# Patient Record
Sex: FEMALE | Race: OTHER | URBAN - METROPOLITAN AREA
[De-identification: names, ages, dates, MRNs, and addresses within clinical notes are randomized per-mention and may not be internally consistent; named-entity substitution may affect disease eponyms.]

---

## 2021-03-03 ENCOUNTER — OFFICE VISIT (OUTPATIENT)
Dept: URBAN - METROPOLITAN AREA CLINIC 115 | Facility: CLINIC | Age: 66
End: 2021-03-03
Payer: MEDICARE

## 2021-03-03 ENCOUNTER — OUT OF OFFICE VISIT (OUTPATIENT)
Dept: URBAN - METROPOLITAN AREA MEDICAL CENTER 31 | Facility: MEDICAL CENTER | Age: 66
End: 2021-03-03
Payer: MEDICARE

## 2021-03-03 ENCOUNTER — OFFICE VISIT (OUTPATIENT)
Dept: URBAN - METROPOLITAN AREA CLINIC 115 | Facility: CLINIC | Age: 66
End: 2021-03-03

## 2021-03-03 ENCOUNTER — WEB ENCOUNTER (OUTPATIENT)
Dept: URBAN - METROPOLITAN AREA CLINIC 115 | Facility: CLINIC | Age: 66
End: 2021-03-03

## 2021-03-03 VITALS
HEART RATE: 54 BPM | SYSTOLIC BLOOD PRESSURE: 143 MMHG | BODY MASS INDEX: 26.87 KG/M2 | WEIGHT: 128 LBS | HEIGHT: 58 IN | TEMPERATURE: 97.3 F | RESPIRATION RATE: 16 BRPM | DIASTOLIC BLOOD PRESSURE: 81 MMHG

## 2021-03-03 DIAGNOSIS — R10.11 ABDOMINAL BURNING SENSATION IN RIGHT UPPER QUADRANT: ICD-10-CM

## 2021-03-03 DIAGNOSIS — R17 CHOLESTATIC JAUNDICE: ICD-10-CM

## 2021-03-03 DIAGNOSIS — R74.01 ALT (SGPT) LEVEL RAISED: ICD-10-CM

## 2021-03-03 DIAGNOSIS — E80.6 ABNORMAL BILIRUBIN TEST: ICD-10-CM

## 2021-03-03 DIAGNOSIS — R93.2 ABNORMAL ULTRASOUND OF LIVER: ICD-10-CM

## 2021-03-03 DIAGNOSIS — R10.11 RIGHT UPPER QUADRANT ABDOMINAL PAIN: ICD-10-CM

## 2021-03-03 DIAGNOSIS — R94.5 ABNORMAL LIVER FUNCTION TESTS: ICD-10-CM

## 2021-03-03 PROBLEM — 15627741000119108: Status: ACTIVE | Noted: 2021-03-03

## 2021-03-03 PROBLEM — 15633881000119102: Status: ACTIVE | Noted: 2021-03-03

## 2021-03-03 PROBLEM — 197433003: Status: ACTIVE | Noted: 2021-03-03

## 2021-03-03 PROBLEM — 267432004: Status: ACTIVE | Noted: 2021-03-03

## 2021-03-03 PROBLEM — 166603001: Status: ACTIVE | Noted: 2021-03-03

## 2021-03-03 PROBLEM — 301717006: Status: ACTIVE | Noted: 2021-03-03

## 2021-03-03 PROCEDURE — 99204 OFFICE O/P NEW MOD 45 MIN: CPT | Performed by: INTERNAL MEDICINE

## 2021-03-03 PROCEDURE — G8427 DOCREV CUR MEDS BY ELIG CLIN: HCPCS | Performed by: PHYSICIAN ASSISTANT

## 2021-03-03 PROCEDURE — 99222 1ST HOSP IP/OBS MODERATE 55: CPT | Performed by: PHYSICIAN ASSISTANT

## 2021-03-03 PROCEDURE — 99245 OFF/OP CONSLTJ NEW/EST HI 55: CPT | Performed by: INTERNAL MEDICINE

## 2021-03-03 RX ORDER — ONDANSETRON 4 MG/1
1 TABLET ON THE TONGUE AND ALLOW TO DISSOLVE TABLET, ORALLY DISINTEGRATING ORAL ONCE A DAY
Status: ACTIVE | COMMUNITY

## 2021-03-03 RX ORDER — LEVOTHYROXINE SODIUM 0.1 MG/1
TABLET ORAL
Qty: 0 | Refills: 0 | Status: ACTIVE | COMMUNITY
Start: 1900-01-01

## 2021-03-03 RX ORDER — TRAZODONE HYDROCHLORIDE 50 MG/1
TABLET ORAL
Qty: 0 | Refills: 0 | Status: ACTIVE | COMMUNITY
Start: 1900-01-01

## 2021-03-03 RX ORDER — ATORVASTATIN CALCIUM 20 MG/1
1 TABLET TABLET, FILM COATED ORAL ONCE A DAY
Status: ACTIVE | COMMUNITY

## 2021-03-03 RX ORDER — GEMFIBROZIL 600 MG/1
TAKE 1 TABLET (600 MG) BY ORAL ROUTE 2 TIMES PER DAY 30 MINUTES BEFORE MORNING AND EVENING MEAL TABLET, FILM COATED ORAL 2
Qty: 0 | Refills: 0 | Status: ON HOLD | COMMUNITY
Start: 1900-01-01

## 2021-03-03 NOTE — HPI-OTHER HISTORIES
Case of 66 y/o  female  with hysterectomy,smoker,HLD,hypothyroidism that came for eval due to abnormal liver function test and liver u/s showing gallbladder polyp, patient also refer RUQ pain, Jaundice, dark urine and light stools for the past few weeks, she has icteric sclera also, she says last alcohol drink was 15 days ago, smoke cigarrettes ocassionally, denies IVDA. Prior EGD with us 2018 with h.pylori s/p tx. Denies black stools or rectal bleeding, no fluid waves in the belly but it was tender on RUQ.no fever

## 2021-03-03 NOTE — PHYSICAL EXAM GASTROINTESTINAL
normal bowel sounds , no masses palpable , no guarding or rigidity , soft,  tender on RUQ, nondistended, no caput medusa or fluid wave

## 2021-03-05 ENCOUNTER — OUT OF OFFICE VISIT (OUTPATIENT)
Dept: URBAN - METROPOLITAN AREA MEDICAL CENTER 31 | Facility: MEDICAL CENTER | Age: 66
End: 2021-03-05
Payer: MEDICARE

## 2021-03-05 DIAGNOSIS — R10.11 ABDOMINAL BURNING SENSATION IN RIGHT UPPER QUADRANT: ICD-10-CM

## 2021-03-05 DIAGNOSIS — R17 CHOLESTATIC JAUNDICE: ICD-10-CM

## 2021-03-05 PROCEDURE — 99232 SBSQ HOSP IP/OBS MODERATE 35: CPT | Performed by: INTERNAL MEDICINE

## 2021-03-06 ENCOUNTER — OUT OF OFFICE VISIT (OUTPATIENT)
Dept: URBAN - METROPOLITAN AREA MEDICAL CENTER 31 | Facility: MEDICAL CENTER | Age: 66
End: 2021-03-06
Payer: MEDICARE

## 2021-03-06 DIAGNOSIS — R10.11 ABDOMINAL BURNING SENSATION IN RIGHT UPPER QUADRANT: ICD-10-CM

## 2021-03-06 DIAGNOSIS — R74.8 ABNORMAL ALKALINE PHOSPHATASE TEST: ICD-10-CM

## 2021-03-06 PROCEDURE — 99232 SBSQ HOSP IP/OBS MODERATE 35: CPT | Performed by: INTERNAL MEDICINE

## 2021-04-01 ENCOUNTER — OFFICE VISIT (OUTPATIENT)
Dept: URBAN - METROPOLITAN AREA CLINIC 115 | Facility: CLINIC | Age: 66
End: 2021-04-01
Payer: MEDICARE

## 2021-04-01 VITALS
TEMPERATURE: 97.7 F | SYSTOLIC BLOOD PRESSURE: 112 MMHG | HEART RATE: 71 BPM | WEIGHT: 127 LBS | BODY MASS INDEX: 26.66 KG/M2 | DIASTOLIC BLOOD PRESSURE: 77 MMHG | RESPIRATION RATE: 16 BRPM | HEIGHT: 58 IN

## 2021-04-01 DIAGNOSIS — K75.4 AUTOIMMUNE HEPATITIS: ICD-10-CM

## 2021-04-01 PROCEDURE — 99214 OFFICE O/P EST MOD 30 MIN: CPT | Performed by: INTERNAL MEDICINE

## 2021-04-01 RX ORDER — GEMFIBROZIL 600 MG/1
TAKE 1 TABLET (600 MG) BY ORAL ROUTE 2 TIMES PER DAY 30 MINUTES BEFORE MORNING AND EVENING MEAL TABLET, FILM COATED ORAL 2
Qty: 0 | Refills: 0 | Status: ON HOLD | COMMUNITY
Start: 1900-01-01

## 2021-04-01 RX ORDER — PREDNISONE 10 MG/1
3 TABLET TABLET ORAL ONCE A DAY
Qty: 90 TABLET | Refills: 1 | OUTPATIENT
Start: 2021-04-01 | End: 2021-05-31

## 2021-04-01 RX ORDER — ATORVASTATIN CALCIUM 20 MG/1
1 TABLET TABLET, FILM COATED ORAL ONCE A DAY
Status: ACTIVE | COMMUNITY

## 2021-04-01 RX ORDER — LEVOTHYROXINE SODIUM 0.1 MG/1
TABLET ORAL
Qty: 0 | Refills: 0 | Status: ACTIVE | COMMUNITY
Start: 1900-01-01

## 2021-04-01 RX ORDER — TRAZODONE HYDROCHLORIDE 50 MG/1
TABLET ORAL
Qty: 0 | Refills: 0 | Status: ACTIVE | COMMUNITY
Start: 1900-01-01

## 2021-04-01 RX ORDER — ONDANSETRON 4 MG/1
1 TABLET ON THE TONGUE AND ALLOW TO DISSOLVE TABLET, ORALLY DISINTEGRATING ORAL ONCE A DAY
Status: ACTIVE | COMMUNITY

## 2021-04-01 NOTE — HPI-TODAY'S VISIT:
64 y/o  female with elevated liver enzymes.S/p cholecystectomy 2/2020.Liver biopsy done showing Changes consistent with AIH given MELINA and ASMA + and elevated total IgG this is the most likely etiology of her abnormal liver function test. She continue with mild post quirurgical pain but denies dark urine, mental changes or black stools. She had EGD without varices on 2018, liver U/S with no solid lesions.   Will request TPMT level prior to start treatment with Imuran. In the meantime will start prednisone 30mg daily. Advise of posible side effects of steroids including tremors, weight gain, edema, mood changes, insomnia and High glucose. Side effect from Imuran also mentioned including bone marrow supresion, non melanotic skin cancer and lymphoma. Patient agree to proceed with tx if neccesary.

## 2021-04-01 NOTE — PHYSICAL EXAM GASTROINTESTINAL
normal bowel sounds , no masses palpable , no guarding or rigidity , soft, tender on RUQ, nondistended no fluid wave or capus medusa

## 2021-04-02 ENCOUNTER — ERX REFILL RESPONSE (OUTPATIENT)
Dept: URBAN - METROPOLITAN AREA CLINIC 82 | Facility: CLINIC | Age: 66
End: 2021-04-02

## 2021-04-02 RX ORDER — PREDNISONE 10 MG/1
TAKE 3 TABLETS BY MOUTH DAILY TABLET ORAL
Qty: 90 | Refills: 0

## 2021-04-06 LAB
ALBUMIN: 4.2
ALKALINE PHOSPHATASE: 139
ALT (SGPT): 38
AST (SGOT): 33
BILIRUBIN, DIRECT: 0.6
BILIRUBIN, TOTAL: 1.1
HEMATOCRIT: 41.1
HEMOGLOBIN: 13.7
INTERPRETATION:: (no result)
Lab: (no result)
MCH: 33.5
MCHC: 33.3
MCV: 101
NRBC: (no result)
PLATELETS: 216
PROTEIN, TOTAL: 7.7
RBC: 4.09
RDW: 15.1
TPMT ACTIVITY: 27.5
WBC: 7.1

## 2021-04-07 ENCOUNTER — WEB ENCOUNTER (OUTPATIENT)
Dept: URBAN - METROPOLITAN AREA CLINIC 115 | Facility: CLINIC | Age: 66
End: 2021-04-07

## 2021-04-07 ENCOUNTER — OFFICE VISIT (OUTPATIENT)
Dept: URBAN - METROPOLITAN AREA CLINIC 115 | Facility: CLINIC | Age: 66
End: 2021-04-07
Payer: MEDICARE

## 2021-04-07 ENCOUNTER — OFFICE VISIT (OUTPATIENT)
Dept: URBAN - METROPOLITAN AREA CLINIC 115 | Facility: CLINIC | Age: 66
End: 2021-04-07

## 2021-04-07 DIAGNOSIS — K75.4 AUTOIMMUNE HEPATITIS: ICD-10-CM

## 2021-04-07 PROCEDURE — 99214 OFFICE O/P EST MOD 30 MIN: CPT | Performed by: INTERNAL MEDICINE

## 2021-04-07 RX ORDER — PREDNISONE 10 MG/1
TAKE 3 TABLETS BY MOUTH DAILY TABLET ORAL
Qty: 90 | Refills: 0 | COMMUNITY

## 2021-04-07 RX ORDER — AZATHIOPRINE 50 1/1
TAKE  2 TABLETS (100 MG) BY ORAL ROUTE ONCE DAILY TABLET ORAL ONCE A DAY
Qty: 180 TABLET | Refills: 0 | OUTPATIENT
Start: 2021-04-07 | End: 2021-07-06

## 2021-04-07 RX ORDER — ONDANSETRON 4 MG/1
1 TABLET ON THE TONGUE AND ALLOW TO DISSOLVE TABLET, ORALLY DISINTEGRATING ORAL ONCE A DAY
Status: ACTIVE | COMMUNITY

## 2021-04-07 RX ORDER — ATORVASTATIN CALCIUM 20 MG/1
1 TABLET TABLET, FILM COATED ORAL ONCE A DAY
Status: ACTIVE | COMMUNITY

## 2021-04-07 RX ORDER — TRAZODONE HYDROCHLORIDE 50 MG/1
TABLET ORAL
Qty: 0 | Refills: 0 | Status: ACTIVE | COMMUNITY
Start: 1900-01-01

## 2021-04-07 RX ORDER — PREDNISONE 10 MG/1
2 TABLETS WITH FOOD OR MILK TABLET ORAL ONCE A DAY
Qty: 60 TABLET | Refills: 1 | OUTPATIENT
End: 2021-06-06

## 2021-04-07 RX ORDER — LEVOTHYROXINE SODIUM 0.1 MG/1
TABLET ORAL
Qty: 0 | Refills: 0 | Status: ACTIVE | COMMUNITY
Start: 1900-01-01

## 2021-04-07 RX ORDER — GEMFIBROZIL 600 MG/1
TAKE 1 TABLET (600 MG) BY ORAL ROUTE 2 TIMES PER DAY 30 MINUTES BEFORE MORNING AND EVENING MEAL TABLET, FILM COATED ORAL 2
Qty: 0 | Refills: 0 | Status: ON HOLD | COMMUNITY
Start: 1900-01-01

## 2021-04-07 RX ORDER — PREDNISONE 10 MG/1
3 TABLET TABLET ORAL ONCE A DAY
Qty: 90 TABLET | Refills: 1 | Status: ACTIVE | COMMUNITY
Start: 2021-04-01 | End: 2021-05-31

## 2021-04-07 NOTE — HPI-TODAY'S VISIT:
64 y/o  female with elevated liver enzymes. S/p cholecystectomy 2/2020. Liver biopsy done showing Changes consistent with AIH given MELINA and ASMA + and elevated total IgG this is the most likely etiology of her abnormal liver function test. She continue with mild post surgical pain in the umbilicus .  Labs on prednisone 30 mg has remarkable response. TPMT levels are safe to use Immuran.  She had EGD without varices on 2018, liver U/S with no solid lesions. recent MRI and MRCP results d/w pt's daughter.  Fatigue is improving.

## 2021-04-29 ENCOUNTER — OFFICE VISIT (OUTPATIENT)
Dept: URBAN - METROPOLITAN AREA CLINIC 115 | Facility: CLINIC | Age: 66
End: 2021-04-29

## 2021-04-29 RX ORDER — ONDANSETRON 4 MG/1
1 TABLET ON THE TONGUE AND ALLOW TO DISSOLVE TABLET, ORALLY DISINTEGRATING ORAL ONCE A DAY
Status: ACTIVE | COMMUNITY

## 2021-04-29 RX ORDER — LEVOTHYROXINE SODIUM 0.1 MG/1
TABLET ORAL
Qty: 0 | Refills: 0 | Status: ACTIVE | COMMUNITY
Start: 1900-01-01

## 2021-04-29 RX ORDER — ATORVASTATIN CALCIUM 20 MG/1
1 TABLET TABLET, FILM COATED ORAL ONCE A DAY
Status: ACTIVE | COMMUNITY

## 2021-04-29 RX ORDER — PREDNISONE 10 MG/1
TAKE 3 TABLETS BY MOUTH DAILY TABLET ORAL
Qty: 90 | Refills: 0 | COMMUNITY

## 2021-04-29 RX ORDER — GEMFIBROZIL 600 MG/1
TAKE 1 TABLET (600 MG) BY ORAL ROUTE 2 TIMES PER DAY 30 MINUTES BEFORE MORNING AND EVENING MEAL TABLET, FILM COATED ORAL 2
Qty: 0 | Refills: 0 | COMMUNITY
Start: 1900-01-01

## 2021-04-29 RX ORDER — TRAZODONE HYDROCHLORIDE 50 MG/1
TABLET ORAL
Qty: 0 | Refills: 0 | Status: ACTIVE | COMMUNITY
Start: 1900-01-01

## 2021-04-29 RX ORDER — AZATHIOPRINE 50 1/1
TAKE  2 TABLETS (100 MG) BY ORAL ROUTE ONCE DAILY TABLET ORAL ONCE A DAY
Qty: 180 TABLET | Refills: 0 | Status: ACTIVE | COMMUNITY
Start: 2021-04-07 | End: 2021-07-06

## 2021-04-29 RX ORDER — PREDNISONE 10 MG/1
2 TABLETS WITH FOOD OR MILK TABLET ORAL ONCE A DAY
Qty: 60 TABLET | Refills: 1 | Status: ACTIVE | COMMUNITY
End: 2021-06-06

## 2021-05-10 ENCOUNTER — TELEPHONE ENCOUNTER (OUTPATIENT)
Dept: URBAN - METROPOLITAN AREA CLINIC 92 | Facility: CLINIC | Age: 66
End: 2021-05-10

## 2021-05-14 LAB
A/G RATIO: 1.5
ALBUMIN: 3.9
ALKALINE PHOSPHATASE: 116
ALT (SGPT): 18
AST (SGOT): 24
BASO (ABSOLUTE): 0
BASOS: 1
BILIRUBIN, TOTAL: 0.4
BUN/CREATININE RATIO: 14
BUN: 12
CALCIUM: 9.3
CARBON DIOXIDE, TOTAL: 24
CHLORIDE: 102
CREATININE: 0.87
EGFR IF AFRICN AM: 81
EGFR IF NONAFRICN AM: 71
EOS (ABSOLUTE): 0.1
EOS: 1
GLOBULIN, TOTAL: 2.6
GLUCOSE: 107
HEMATOCRIT: 43.2
HEMATOLOGY COMMENTS:: (no result)
HEMOGLOBIN: 14.7
IMMATURE CELLS: (no result)
IMMATURE GRANS (ABS): 0
IMMATURE GRANULOCYTES: 0
LYMPHS (ABSOLUTE): 1.7
LYMPHS: 28
MCH: 34.9
MCHC: 34
MCV: 103
MONOCYTES(ABSOLUTE): 0.5
MONOCYTES: 9
NEUTROPHILS (ABSOLUTE): 3.8
NEUTROPHILS: 61
NRBC: (no result)
PLATELETS: 205
POTASSIUM: 4.5
PROTEIN, TOTAL: 6.5
RBC: 4.21
RDW: 12.8
SODIUM: 138
WBC: 6.2

## 2021-05-19 ENCOUNTER — OFFICE VISIT (OUTPATIENT)
Dept: URBAN - METROPOLITAN AREA CLINIC 115 | Facility: CLINIC | Age: 66
End: 2021-05-19
Payer: MEDICARE

## 2021-05-19 ENCOUNTER — WEB ENCOUNTER (OUTPATIENT)
Dept: URBAN - METROPOLITAN AREA CLINIC 115 | Facility: CLINIC | Age: 66
End: 2021-05-19

## 2021-05-19 ENCOUNTER — TELEPHONE ENCOUNTER (OUTPATIENT)
Dept: URBAN - METROPOLITAN AREA CLINIC 115 | Facility: CLINIC | Age: 66
End: 2021-05-19

## 2021-05-19 VITALS
HEIGHT: 58 IN | RESPIRATION RATE: 16 BRPM | HEART RATE: 61 BPM | TEMPERATURE: 97.3 F | WEIGHT: 134 LBS | DIASTOLIC BLOOD PRESSURE: 76 MMHG | BODY MASS INDEX: 28.13 KG/M2 | SYSTOLIC BLOOD PRESSURE: 130 MMHG

## 2021-05-19 DIAGNOSIS — K75.4 AUTOIMMUNE HEPATITIS: ICD-10-CM

## 2021-05-19 DIAGNOSIS — R94.5 ABNORMAL LIVER FUNCTION TESTS: ICD-10-CM

## 2021-05-19 PROCEDURE — 99204 OFFICE O/P NEW MOD 45 MIN: CPT | Performed by: INTERNAL MEDICINE

## 2021-05-19 RX ORDER — LEVOTHYROXINE SODIUM 0.1 MG/1
TABLET ORAL
Qty: 0 | Refills: 0 | Status: ACTIVE | COMMUNITY
Start: 1900-01-01

## 2021-05-19 RX ORDER — PREDNISONE 10 MG/1
TAKE 3 TABLETS BY MOUTH DAILY TABLET ORAL
Qty: 90 | Refills: 0 | COMMUNITY

## 2021-05-19 RX ORDER — AZATHIOPRINE 75 MG/1
AS DIRECTED TABLET ORAL DAILY
Qty: 30 | Refills: 2 | OUTPATIENT
Start: 2021-05-19 | End: 2021-08-17

## 2021-05-19 RX ORDER — TRAZODONE HYDROCHLORIDE 50 MG/1
TABLET ORAL
Qty: 0 | Refills: 0 | Status: ACTIVE | COMMUNITY
Start: 1900-01-01

## 2021-05-19 RX ORDER — AZATHIOPRINE 50 MG/1
1.5 TABLET TABLET ORAL DAILY
Qty: 45 TABLET | Refills: 1 | OUTPATIENT
Start: 2021-05-19 | End: 2021-07-18

## 2021-05-19 RX ORDER — ONDANSETRON 4 MG/1
1 TABLET ON THE TONGUE AND ALLOW TO DISSOLVE TABLET, ORALLY DISINTEGRATING ORAL ONCE A DAY
COMMUNITY

## 2021-05-19 RX ORDER — PREDNISONE 10 MG/1
1 TABLET TABLET ORAL ONCE A DAY
Qty: 30 | Refills: 2 | OUTPATIENT
Start: 2021-05-19 | End: 2021-08-17

## 2021-05-19 RX ORDER — AZATHIOPRINE 50 1/1
TAKE  2 TABLETS (100 MG) BY ORAL ROUTE ONCE DAILY TABLET ORAL ONCE A DAY
Qty: 180 TABLET | Refills: 0 | Status: ON HOLD | COMMUNITY
Start: 2021-04-07 | End: 2021-07-06

## 2021-05-19 RX ORDER — GEMFIBROZIL 600 MG/1
TAKE 1 TABLET (600 MG) BY ORAL ROUTE 2 TIMES PER DAY 30 MINUTES BEFORE MORNING AND EVENING MEAL TABLET, FILM COATED ORAL 2
Qty: 0 | Refills: 0 | COMMUNITY
Start: 1900-01-01

## 2021-05-19 RX ORDER — PREDNISONE 10 MG/1
2 TABLETS WITH FOOD OR MILK TABLET ORAL ONCE A DAY
Qty: 60 TABLET | Refills: 1 | Status: ACTIVE | COMMUNITY
End: 2021-06-06

## 2021-05-19 RX ORDER — ATORVASTATIN CALCIUM 20 MG/1
1 TABLET TABLET, FILM COATED ORAL ONCE A DAY
Status: ON HOLD | COMMUNITY

## 2021-05-19 NOTE — HPI-TODAY'S VISIT:
64 y/o  female with AIH normal TPMT level that is on prednisone 20mg daily only, on last eval we prescribed Imuran but she dont get the prescription. Given BMI will start her on 75 mg Imuran and 10 mg prednisone. most recent labs with normal transaminases and bilirubin. She had bx proben AIH.

## 2021-05-20 ENCOUNTER — TELEPHONE ENCOUNTER (OUTPATIENT)
Dept: URBAN - METROPOLITAN AREA CLINIC 82 | Facility: CLINIC | Age: 66
End: 2021-05-20

## 2021-06-04 ENCOUNTER — LAB OUTSIDE AN ENCOUNTER (OUTPATIENT)
Dept: URBAN - METROPOLITAN AREA CLINIC 115 | Facility: CLINIC | Age: 66
End: 2021-06-04

## 2021-06-26 LAB
A/G RATIO: 1.4
ALBUMIN: 4
ALKALINE PHOSPHATASE: 126
AST (SGOT): 24
BASO (ABSOLUTE): 0
BASOS: 1
BILIRUBIN, TOTAL: 0.4
BUN/CREATININE RATIO: 16
BUN: 12
CALCIUM: 9.5
CHLORIDE: 104
CREATININE: 0.74
EGFR IF AFRICN AM: 99
EGFR IF NONAFRICN AM: 86
EOS (ABSOLUTE): 0
EOS: 1
GLOBULIN, TOTAL: 2.9
GLUCOSE: 90
HEMATOCRIT: 43.6
HEMATOLOGY COMMENTS:: (no result)
HEMOGLOBIN: 14.3
IMMATURE CELLS: (no result)
IMMATURE GRANS (ABS): 0
IMMATURE GRANULOCYTES: 0
LYMPHS (ABSOLUTE): 2
LYMPHS: 48
MCH: 33.6
MCHC: 32.8
MCV: 103
MONOCYTES(ABSOLUTE): 0.4
MONOCYTES: 9
NEUTROPHILS (ABSOLUTE): 1.7
NEUTROPHILS: 41
NRBC: (no result)
PLATELETS: 235
POTASSIUM: 4.8
PROTEIN, TOTAL: 6.9
RBC: 4.25
RDW: 13.1
SODIUM: 139
WBC: 4.1

## 2021-08-12 ENCOUNTER — LAB OUTSIDE AN ENCOUNTER (OUTPATIENT)
Dept: URBAN - METROPOLITAN AREA CLINIC 115 | Facility: CLINIC | Age: 66
End: 2021-08-12

## 2021-08-12 ENCOUNTER — TELEPHONE ENCOUNTER (OUTPATIENT)
Dept: URBAN - METROPOLITAN AREA CLINIC 82 | Facility: CLINIC | Age: 66
End: 2021-08-12

## 2021-08-15 LAB
A/G RATIO: 1.8
ALBUMIN: 4.4
ALKALINE PHOSPHATASE: 142
ALT (SGPT): 12
AST (SGOT): 23
BASO (ABSOLUTE): 0
BASOS: 1
BILIRUBIN, TOTAL: 0.6
BUN/CREATININE RATIO: 11
BUN: 8
CALCIUM: 9.9
CARBON DIOXIDE, TOTAL: 25
CHLORIDE: 101
CREATININE: 0.75
EGFR IF AFRICN AM: 97
EGFR IF NONAFRICN AM: 85
EOS (ABSOLUTE): 0
EOS: 0
GLOBULIN, TOTAL: 2.5
GLUCOSE: 101
HEMATOCRIT: 41.5
HEMATOLOGY COMMENTS:: (no result)
HEMOGLOBIN: 13.6
IMMATURE CELLS: (no result)
IMMATURE GRANS (ABS): 0
IMMATURE GRANULOCYTES: 0
LYMPHS (ABSOLUTE): 1.8
LYMPHS: 40
MCH: 32.5
MCHC: 32.8
MCV: 99
MONOCYTES(ABSOLUTE): 0.3
MONOCYTES: 7
NEUTROPHILS (ABSOLUTE): 2.3
NEUTROPHILS: 52
NRBC: (no result)
PLATELETS: 188
POTASSIUM: 4.6
PROTEIN, TOTAL: 6.9
RBC: 4.19
RDW: 14.2
SODIUM: 137
WBC: 4.5

## 2021-08-16 ENCOUNTER — TELEPHONE ENCOUNTER (OUTPATIENT)
Dept: URBAN - METROPOLITAN AREA CLINIC 82 | Facility: CLINIC | Age: 66
End: 2021-08-16

## 2021-08-16 RX ORDER — AZATHIOPRINE 75 MG/1
AS DIRECTED TABLET ORAL DAILY
Qty: 60 | Refills: 0 | OUTPATIENT
Start: 2021-08-16 | End: 2021-09-15

## 2021-08-16 RX ORDER — PREDNISONE 5 MG/1
1 TABLET TABLET ORAL ONCE A DAY
Qty: 30 | Refills: 0 | OUTPATIENT
Start: 2021-08-16 | End: 2021-09-15

## 2021-08-18 ENCOUNTER — OFFICE VISIT (OUTPATIENT)
Dept: URBAN - METROPOLITAN AREA CLINIC 115 | Facility: CLINIC | Age: 66
End: 2021-08-18

## 2021-09-01 ENCOUNTER — OFFICE VISIT (OUTPATIENT)
Dept: URBAN - METROPOLITAN AREA CLINIC 115 | Facility: CLINIC | Age: 66
End: 2021-09-01
Payer: MEDICARE

## 2021-09-01 ENCOUNTER — WEB ENCOUNTER (OUTPATIENT)
Dept: URBAN - METROPOLITAN AREA CLINIC 115 | Facility: CLINIC | Age: 66
End: 2021-09-01

## 2021-09-01 VITALS
WEIGHT: 128 LBS | DIASTOLIC BLOOD PRESSURE: 85 MMHG | RESPIRATION RATE: 16 BRPM | BODY MASS INDEX: 26.87 KG/M2 | HEART RATE: 65 BPM | SYSTOLIC BLOOD PRESSURE: 146 MMHG | TEMPERATURE: 97.85 F | HEIGHT: 58 IN

## 2021-09-01 DIAGNOSIS — K75.4 AUTOIMMUNE HEPATITIS: ICD-10-CM

## 2021-09-01 PROCEDURE — 99214 OFFICE O/P EST MOD 30 MIN: CPT | Performed by: INTERNAL MEDICINE

## 2021-09-01 RX ORDER — TRAZODONE HYDROCHLORIDE 50 MG/1
TABLET ORAL
Qty: 0 | Refills: 0 | Status: ACTIVE | COMMUNITY
Start: 1900-01-01

## 2021-09-01 RX ORDER — ONDANSETRON 4 MG/1
1 TABLET ON THE TONGUE AND ALLOW TO DISSOLVE TABLET, ORALLY DISINTEGRATING ORAL ONCE A DAY
COMMUNITY

## 2021-09-01 RX ORDER — PREDNISONE 5 MG/1
1 TABLET TABLET ORAL ONCE A DAY
Qty: 30 TABLET | Refills: 3 | OUTPATIENT

## 2021-09-01 RX ORDER — AZATHIOPRINE 50 MG/1
AS DIRECTED TABLET ORAL DAILY
Qty: 30 | Refills: 3 | OUTPATIENT

## 2021-09-01 RX ORDER — LEVOTHYROXINE SODIUM 0.1 MG/1
TABLET ORAL
Qty: 0 | Refills: 0 | Status: ACTIVE | COMMUNITY
Start: 1900-01-01

## 2021-09-01 RX ORDER — PREDNISONE 10 MG/1
TAKE 3 TABLETS BY MOUTH DAILY TABLET ORAL
Qty: 90 | Refills: 0 | COMMUNITY

## 2021-09-01 RX ORDER — AZATHIOPRINE 75 MG/1
AS DIRECTED TABLET ORAL DAILY
Qty: 60 | Refills: 0 | Status: ACTIVE | COMMUNITY
Start: 2021-08-16 | End: 2021-09-15

## 2021-09-01 RX ORDER — GEMFIBROZIL 600 MG/1
TAKE 1 TABLET (600 MG) BY ORAL ROUTE 2 TIMES PER DAY 30 MINUTES BEFORE MORNING AND EVENING MEAL TABLET, FILM COATED ORAL 2
Qty: 0 | Refills: 0 | COMMUNITY
Start: 1900-01-01

## 2021-09-01 RX ORDER — PREDNISONE 5 MG/1
1 TABLET TABLET ORAL ONCE A DAY
Qty: 30 | Refills: 0 | Status: ACTIVE | COMMUNITY
Start: 2021-08-16 | End: 2021-09-15

## 2021-09-01 RX ORDER — ATORVASTATIN CALCIUM 20 MG/1
1 TABLET TABLET, FILM COATED ORAL ONCE A DAY
Status: ON HOLD | COMMUNITY

## 2021-09-01 NOTE — HPI-TODAY'S VISIT:
66 y/o  female with AIH that came for routine follow up.Denies any alarm symptoms or signs. She get covid19 vaccines series already.She is on prednisone 5mg and Imuran 75 mg.Labs are stable will no make any changes on therapy today. Colonoscopy 12/2018 with no polyps

## 2021-09-03 ENCOUNTER — TELEPHONE ENCOUNTER (OUTPATIENT)
Dept: URBAN - METROPOLITAN AREA CLINIC 82 | Facility: CLINIC | Age: 66
End: 2021-09-03

## 2021-09-03 RX ORDER — AZATHIOPRINE 50 MG/1
1 AND A HALF TAB(TOMAR UNA TABLETA Y MEDIA) TABLET ORAL DAILY
Qty: 90 | Refills: 2 | OUTPATIENT

## 2021-09-06 ENCOUNTER — LAB OUTSIDE AN ENCOUNTER (OUTPATIENT)
Dept: URBAN - METROPOLITAN AREA CLINIC 115 | Facility: CLINIC | Age: 66
End: 2021-09-06

## 2021-09-18 LAB
A/G RATIO: 1.5
ALBUMIN: 4
ALKALINE PHOSPHATASE: 148
ALT (SGPT): 23
AST (SGOT): 25
BASO (ABSOLUTE): 0.1
BASOS: 1
BILIRUBIN, TOTAL: 0.5
BUN/CREATININE RATIO: 9
BUN: 7
CALCIUM: 9.2
CARBON DIOXIDE, TOTAL: 26
CHLORIDE: 104
CREATININE: 0.75
EGFR IF AFRICN AM: 97
EGFR IF NONAFRICN AM: 85
EOS (ABSOLUTE): 0.1
EOS: 2
GLOBULIN, TOTAL: 2.6
GLUCOSE: 78
HEMATOCRIT: 42.1
HEMATOLOGY COMMENTS:: (no result)
HEMOGLOBIN: 13.9
IMMATURE CELLS: (no result)
IMMATURE GRANS (ABS): 0
IMMATURE GRANULOCYTES: 0
LYMPHS (ABSOLUTE): 2.6
LYMPHS: 50
MCH: 34
MCHC: 33
MCV: 103
MONOCYTES(ABSOLUTE): 0.4
MONOCYTES: 9
NEUTROPHILS (ABSOLUTE): 1.9
NEUTROPHILS: 38
NRBC: (no result)
PLATELETS: 197
POTASSIUM: 4.3
PROTEIN, TOTAL: 6.6
RBC: 4.09
RDW: 12.8
SODIUM: 141
WBC: 5

## 2022-01-11 ENCOUNTER — WEB ENCOUNTER (OUTPATIENT)
Dept: URBAN - METROPOLITAN AREA CLINIC 115 | Facility: CLINIC | Age: 67
End: 2022-01-11

## 2022-01-11 ENCOUNTER — OFFICE VISIT (OUTPATIENT)
Dept: URBAN - METROPOLITAN AREA CLINIC 115 | Facility: CLINIC | Age: 67
End: 2022-01-11
Payer: MEDICARE

## 2022-01-11 DIAGNOSIS — K75.4 AUTOIMMUNE HEPATITIS: ICD-10-CM

## 2022-01-11 PROCEDURE — 99214 OFFICE O/P EST MOD 30 MIN: CPT | Performed by: INTERNAL MEDICINE

## 2022-01-11 RX ORDER — AZATHIOPRINE 50 MG/1
1 AND A HALF TAB(TOMAR UNA TABLETA Y MEDIA) TABLET ORAL DAILY
Qty: 90 | Refills: 2 | Status: ACTIVE | COMMUNITY

## 2022-01-11 RX ORDER — GEMFIBROZIL 600 MG/1
TAKE 1 TABLET (600 MG) BY ORAL ROUTE 2 TIMES PER DAY 30 MINUTES BEFORE MORNING AND EVENING MEAL TABLET, FILM COATED ORAL 2
Qty: 0 | Refills: 0 | COMMUNITY
Start: 1900-01-01

## 2022-01-11 RX ORDER — PREDNISONE 5 MG/1
1 TABLET TABLET ORAL ONCE A DAY
Qty: 30 | Refills: 4 | OUTPATIENT

## 2022-01-11 RX ORDER — AZATHIOPRINE 50 MG/1
AS DIRECTED TABLET ORAL DAILY
Qty: 30 | Refills: 3 | Status: ACTIVE | COMMUNITY

## 2022-01-11 RX ORDER — PREDNISONE 5 MG/1
1 TABLET TABLET ORAL ONCE A DAY
Qty: 30 TABLET | Refills: 3 | Status: ACTIVE | COMMUNITY

## 2022-01-11 RX ORDER — LEVOTHYROXINE SODIUM 0.1 MG/1
TABLET ORAL
Qty: 0 | Refills: 0 | Status: ACTIVE | COMMUNITY
Start: 1900-01-01

## 2022-01-11 RX ORDER — PREDNISONE 10 MG/1
TAKE 3 TABLETS BY MOUTH DAILY TABLET ORAL
Qty: 90 | Refills: 0 | COMMUNITY

## 2022-01-11 RX ORDER — ONDANSETRON 4 MG/1
1 TABLET ON THE TONGUE AND ALLOW TO DISSOLVE TABLET, ORALLY DISINTEGRATING ORAL ONCE A DAY
COMMUNITY

## 2022-01-11 RX ORDER — AZATHIOPRINE 50 MG/1
1 AND A HALF TAB(TOMAR UNA TABLETA Y MEDIA) TABLET ORAL DAILY
Qty: 30 | Refills: 4 | OUTPATIENT

## 2022-01-11 RX ORDER — TRAZODONE HYDROCHLORIDE 50 MG/1
TABLET ORAL
Qty: 0 | Refills: 0 | Status: ACTIVE | COMMUNITY
Start: 1900-01-01

## 2022-01-11 RX ORDER — ATORVASTATIN CALCIUM 20 MG/1
1 TABLET TABLET, FILM COATED ORAL ONCE A DAY
Status: ON HOLD | COMMUNITY

## 2022-01-11 NOTE — HPI-TODAY'S VISIT:
67 y/o  female with biopsy proven AIH on Imuran 75mg daily and Prednisone 5mg. no complaints today. She had the covi19 booster.Labs from 9/2021 with stable liver enzymea and normal cbc.COlo 2018 with no polyps.

## 2022-01-12 LAB
A/G RATIO: 1.6
ALBUMIN: 4.2
ALKALINE PHOSPHATASE: 135
ALT (SGPT): 12
AST (SGOT): 20
BASO (ABSOLUTE): 0
BASOS: 1
BILIRUBIN, TOTAL: 0.3
BUN/CREATININE RATIO: 17
BUN: 10
CALCIUM: 9.7
CARBON DIOXIDE, TOTAL: 24
CHLORIDE: 102
CREATININE: 0.6
EGFR IF AFRICN AM: 111
EGFR IF NONAFRICN AM: 96
EOS (ABSOLUTE): 0
EOS: 1
GLOBULIN, TOTAL: 2.7
GLUCOSE: 88
HEMATOCRIT: 40.2
HEMATOLOGY COMMENTS:: (no result)
HEMOGLOBIN: 13.4
IMMATURE CELLS: (no result)
IMMATURE GRANS (ABS): 0
IMMATURE GRANULOCYTES: 0
LYMPHS (ABSOLUTE): 1.9
LYMPHS: 41
MCH: 34.4
MCHC: 33.3
MCV: 103
MONOCYTES(ABSOLUTE): 0.4
MONOCYTES: 8
NEUTROPHILS (ABSOLUTE): 2.3
NEUTROPHILS: 49
NRBC: (no result)
PLATELETS: 210
POTASSIUM: 4.2
PROTEIN, TOTAL: 6.9
RBC: 3.9
RDW: 14.2
SODIUM: 139
WBC: 4.7

## 2022-05-10 ENCOUNTER — OFFICE VISIT (OUTPATIENT)
Dept: URBAN - METROPOLITAN AREA CLINIC 115 | Facility: CLINIC | Age: 67
End: 2022-05-10
Payer: MEDICARE

## 2022-05-10 VITALS
HEART RATE: 58 BPM | BODY MASS INDEX: 27.16 KG/M2 | TEMPERATURE: 97.9 F | DIASTOLIC BLOOD PRESSURE: 74 MMHG | SYSTOLIC BLOOD PRESSURE: 136 MMHG | WEIGHT: 129.4 LBS | HEIGHT: 58 IN | RESPIRATION RATE: 17 BRPM

## 2022-05-10 DIAGNOSIS — K75.4 AUTOIMMUNE HEPATITIS: ICD-10-CM

## 2022-05-10 PROBLEM — 408335007: Status: ACTIVE | Noted: 2021-04-01

## 2022-05-10 PROCEDURE — 99213 OFFICE O/P EST LOW 20 MIN: CPT | Performed by: INTERNAL MEDICINE

## 2022-05-10 RX ORDER — ATORVASTATIN CALCIUM 20 MG/1
1 TABLET TABLET, FILM COATED ORAL ONCE A DAY
Status: ON HOLD | COMMUNITY

## 2022-05-10 RX ORDER — PREDNISONE 5 MG/1
1 TABLET TABLET ORAL ONCE A DAY
Qty: 30 | Refills: 4 | Status: ACTIVE | COMMUNITY

## 2022-05-10 RX ORDER — PREDNISONE 10 MG/1
TAKE 3 TABLETS BY MOUTH DAILY TABLET ORAL
Qty: 90 | Refills: 0 | COMMUNITY

## 2022-05-10 RX ORDER — PREDNISONE 5 MG/1
1 TABLET TABLET ORAL ONCE A DAY
Qty: 30 | Refills: 5 | OUTPATIENT

## 2022-05-10 RX ORDER — AZATHIOPRINE 50 MG/1
AS DIRECTED TABLET ORAL DAILY
Qty: 30 | Refills: 3 | Status: ACTIVE | COMMUNITY

## 2022-05-10 RX ORDER — GEMFIBROZIL 600 MG/1
TAKE 1 TABLET (600 MG) BY ORAL ROUTE 2 TIMES PER DAY 30 MINUTES BEFORE MORNING AND EVENING MEAL TABLET, FILM COATED ORAL 2
Qty: 0 | Refills: 0 | COMMUNITY
Start: 1900-01-01

## 2022-05-10 RX ORDER — TRAZODONE HYDROCHLORIDE 50 MG/1
TABLET ORAL
Qty: 0 | Refills: 0 | Status: ACTIVE | COMMUNITY
Start: 1900-01-01

## 2022-05-10 RX ORDER — AZATHIOPRINE 50 MG/1
1 AND A HALF TAB(TOMAR UNA TABLETA Y MEDIA) TABLET ORAL DAILY
Qty: 30 | Refills: 5 | OUTPATIENT

## 2022-05-10 RX ORDER — ONDANSETRON 4 MG/1
1 TABLET ON THE TONGUE AND ALLOW TO DISSOLVE TABLET, ORALLY DISINTEGRATING ORAL ONCE A DAY
COMMUNITY

## 2022-05-10 RX ORDER — LEVOTHYROXINE SODIUM 0.1 MG/1
TABLET ORAL
Qty: 0 | Refills: 0 | Status: ACTIVE | COMMUNITY
Start: 1900-01-01

## 2022-05-10 RX ORDER — AZATHIOPRINE 50 MG/1
1 AND A HALF TAB(TOMAR UNA TABLETA Y MEDIA) TABLET ORAL DAILY
Qty: 30 | Refills: 4 | Status: ACTIVE | COMMUNITY

## 2022-05-10 NOTE — HPI-TODAY'S VISIT:
67 y/o  female with biopsy and serology + for AIH on Imuran and prednisone that came for routine follow up. Denies any recent hospital or ER visit.No fever,cough, chest pain, fatigue or GI bleeding. [place] 2018 with no polyps.S/p cholecystectomy 2019 no malignancy on path,cholesterol polyp.

## 2022-05-11 LAB
A/G RATIO: 1.6
ALBUMIN: 4.1
ALKALINE PHOSPHATASE: 110
ALT (SGPT): 12
AST (SGOT): 22
BASO (ABSOLUTE): 0
BASOS: 0
BILIRUBIN, TOTAL: 0.3
BUN/CREATININE RATIO: 11
BUN: 9
CALCIUM: 9.5
CARBON DIOXIDE, TOTAL: 26
CHLORIDE: 103
CREATININE: 0.8
EGFR: 82
EOS (ABSOLUTE): 0
EOS: 1
GLOBULIN, TOTAL: 2.6
GLUCOSE: 91
HEMATOCRIT: 39.2
HEMATOLOGY COMMENTS:: (no result)
HEMOGLOBIN: 13.2
IMMATURE CELLS: (no result)
IMMATURE GRANS (ABS): 0
IMMATURE GRANULOCYTES: 0
LYMPHS (ABSOLUTE): 1.2
LYMPHS: 24
MCH: 35.3
MCHC: 33.7
MCV: 105
MONOCYTES(ABSOLUTE): 0.3
MONOCYTES: 7
NEUTROPHILS (ABSOLUTE): 3.3
NEUTROPHILS: 68
NRBC: (no result)
PLATELETS: 189
POTASSIUM: 4.3
PROTEIN, TOTAL: 6.7
RBC: 3.74
RDW: 13.9
SODIUM: 141
WBC: 4.8

## 2022-06-01 ENCOUNTER — TELEPHONE ENCOUNTER (OUTPATIENT)
Dept: URBAN - METROPOLITAN AREA CLINIC 92 | Facility: CLINIC | Age: 67
End: 2022-06-01

## 2022-06-01 RX ORDER — AZATHIOPRINE 50 MG/1
1 AND A HALF TAB(TOMAR UNA TABLETA Y MEDIA) TABLET ORAL DAILY
Qty: 90 | Refills: 1 | OUTPATIENT

## 2022-09-28 ENCOUNTER — TELEPHONE ENCOUNTER (OUTPATIENT)
Dept: URBAN - METROPOLITAN AREA CLINIC 92 | Facility: CLINIC | Age: 67
End: 2022-09-28

## 2022-09-28 RX ORDER — PREDNISONE 5 MG/1
1 TABLET TABLET ORAL ONCE A DAY
Qty: 30 TABLET | Refills: 2 | OUTPATIENT

## 2022-11-22 ENCOUNTER — TELEPHONE ENCOUNTER (OUTPATIENT)
Dept: URBAN - METROPOLITAN AREA CLINIC 115 | Facility: CLINIC | Age: 67
End: 2022-11-22

## 2022-11-22 RX ORDER — AZATHIOPRINE 50 MG/1
1 AND A HALF TAB(TOMAR UNA TABLETA Y MEDIA) TABLET ORAL DAILY
Qty: 90 | Refills: 0

## 2022-11-22 RX ORDER — PREDNISONE 5 MG/1
1 TABLET TABLET ORAL ONCE A DAY
Qty: 90 | Refills: 0

## 2022-12-02 ENCOUNTER — OFFICE VISIT (OUTPATIENT)
Dept: URBAN - METROPOLITAN AREA CLINIC 82 | Facility: CLINIC | Age: 67
End: 2022-12-02

## 2023-01-20 ENCOUNTER — OFFICE VISIT (OUTPATIENT)
Dept: URBAN - METROPOLITAN AREA CLINIC 82 | Facility: CLINIC | Age: 68
End: 2023-01-20
Payer: MEDICARE

## 2023-01-20 VITALS
DIASTOLIC BLOOD PRESSURE: 82 MMHG | HEART RATE: 80 BPM | BODY MASS INDEX: 27.04 KG/M2 | HEIGHT: 58 IN | TEMPERATURE: 96.8 F | SYSTOLIC BLOOD PRESSURE: 148 MMHG | WEIGHT: 128.8 LBS

## 2023-01-20 DIAGNOSIS — K75.4 AUTOIMMUNE HEPATITIS: ICD-10-CM

## 2023-01-20 PROCEDURE — 99212 OFFICE O/P EST SF 10 MIN: CPT | Performed by: INTERNAL MEDICINE

## 2023-01-20 RX ORDER — AZATHIOPRINE 50 MG/1
AS DIRECTED TABLET ORAL DAILY
Qty: 30 | Refills: 3 | Status: ACTIVE | COMMUNITY

## 2023-01-20 RX ORDER — ONDANSETRON 4 MG/1
1 TABLET ON THE TONGUE AND ALLOW TO DISSOLVE TABLET, ORALLY DISINTEGRATING ORAL ONCE A DAY
COMMUNITY

## 2023-01-20 RX ORDER — PREDNISONE 10 MG/1
TAKE 3 TABLETS BY MOUTH DAILY TABLET ORAL
Qty: 90 | Refills: 0 | COMMUNITY

## 2023-01-20 RX ORDER — PREDNISONE 5 MG/1
1 TABLET TABLET ORAL ONCE A DAY
Qty: 90 | Refills: 0 | Status: ACTIVE | COMMUNITY

## 2023-01-20 RX ORDER — AZATHIOPRINE 50 MG/1
1 AND A HALF TAB(TOMAR UNA TABLETA Y MEDIA) TABLET ORAL DAILY
Qty: 90 | Refills: 0 | Status: ACTIVE | COMMUNITY

## 2023-01-20 RX ORDER — ATORVASTATIN CALCIUM 20 MG/1
1 TABLET TABLET, FILM COATED ORAL ONCE A DAY
Status: ON HOLD | COMMUNITY

## 2023-01-20 RX ORDER — AZATHIOPRINE 50 MG/1
1 AND A HALF TAB(TOMAR UNA TABLETA Y MEDIA) TABLET ORAL DAILY
Qty: 60 | Refills: 0 | OUTPATIENT

## 2023-01-20 RX ORDER — LEVOTHYROXINE SODIUM 0.1 MG/1
TABLET ORAL
Qty: 0 | Refills: 0 | Status: ACTIVE | COMMUNITY
Start: 1900-01-01

## 2023-01-20 RX ORDER — TRAZODONE HYDROCHLORIDE 50 MG/1
TABLET ORAL
Qty: 0 | Refills: 0 | Status: ACTIVE | COMMUNITY
Start: 1900-01-01

## 2023-01-20 RX ORDER — GEMFIBROZIL 600 MG/1
TAKE 1 TABLET (600 MG) BY ORAL ROUTE 2 TIMES PER DAY 30 MINUTES BEFORE MORNING AND EVENING MEAL TABLET, FILM COATED ORAL 2
Qty: 0 | Refills: 0 | COMMUNITY
Start: 1900-01-01

## 2023-01-20 NOTE — HPI-TODAY'S VISIT:
66 y/o female with biopsy proven AIH on prednisone 5mg and Imuran 75mg daily ,came for routine f/up and medication refill.Refer some fatigue,refer recent labs with PCP WNL. Plan is to d/c prednisone for holyday and continue monotherapy with Imuran 75mg with goal to decrease It to 50mg down the road is enzymes remain stable.

## 2023-02-03 ENCOUNTER — LAB OUTSIDE AN ENCOUNTER (OUTPATIENT)
Dept: URBAN - METROPOLITAN AREA CLINIC 82 | Facility: CLINIC | Age: 68
End: 2023-02-03

## 2023-02-25 LAB
A/G RATIO: 1.5
ALBUMIN: 4.2
ALKALINE PHOSPHATASE: 116
ALT (SGPT): 10
AST (SGOT): 19
BILIRUBIN, TOTAL: 0.6
BUN/CREATININE RATIO: (no result)
BUN: 12
CALCIUM: 9.5
CARBON DIOXIDE, TOTAL: 26
CHLORIDE: 105
CREATININE: 0.68
EGFR: 96
GLOBULIN, TOTAL: 2.8
GLUCOSE: 87
HEMATOCRIT: 38.3
HEMOGLOBIN: 13
MCH: 33.2
MCHC: 33.9
MCV: 98
MPV: 11.5
PLATELET COUNT: 176
POTASSIUM: 4.2
PROTEIN, TOTAL: 7
RDW: 13.1
RED BLOOD CELL COUNT: 3.91
SODIUM: 139
WHITE BLOOD CELL COUNT: 3.5

## 2023-03-01 ENCOUNTER — TELEPHONE ENCOUNTER (OUTPATIENT)
Dept: URBAN - METROPOLITAN AREA CLINIC 82 | Facility: CLINIC | Age: 68
End: 2023-03-01

## 2023-09-22 ENCOUNTER — OFFICE VISIT (OUTPATIENT)
Dept: URBAN - METROPOLITAN AREA CLINIC 82 | Facility: CLINIC | Age: 68
End: 2023-09-22
Payer: MEDICARE

## 2023-09-22 VITALS
TEMPERATURE: 97.2 F | SYSTOLIC BLOOD PRESSURE: 167 MMHG | WEIGHT: 128.4 LBS | HEIGHT: 58 IN | HEART RATE: 61 BPM | BODY MASS INDEX: 26.95 KG/M2 | DIASTOLIC BLOOD PRESSURE: 82 MMHG

## 2023-09-22 DIAGNOSIS — K75.4 AUTOIMMUNE HEPATITIS: ICD-10-CM

## 2023-09-22 PROCEDURE — 99213 OFFICE O/P EST LOW 20 MIN: CPT | Performed by: INTERNAL MEDICINE

## 2023-09-22 RX ORDER — ATORVASTATIN CALCIUM 20 MG/1
1 TABLET TABLET, FILM COATED ORAL ONCE A DAY
Status: ON HOLD | COMMUNITY

## 2023-09-22 RX ORDER — ONDANSETRON 4 MG/1
1 TABLET ON THE TONGUE AND ALLOW TO DISSOLVE TABLET, ORALLY DISINTEGRATING ORAL ONCE A DAY
COMMUNITY

## 2023-09-22 RX ORDER — AZATHIOPRINE 50 MG/1
1 AND A HALF TAB(TOMAR UNA TABLETA Y MEDIA) TABLET ORAL DAILY
Qty: 60 | Refills: 0 | Status: ACTIVE | COMMUNITY

## 2023-09-22 RX ORDER — AZATHIOPRINE 50 MG/1
AS DIRECTED TABLET ORAL DAILY
Qty: 30 | Refills: 3 | Status: ON HOLD | COMMUNITY

## 2023-09-22 RX ORDER — TRAZODONE HYDROCHLORIDE 50 MG/1
TABLET ORAL
Qty: 0 | Refills: 0 | Status: ACTIVE | COMMUNITY
Start: 1900-01-01

## 2023-09-22 RX ORDER — OMEPRAZOLE 20 MG/1
1 CAPSULE 30 MINUTES BEFORE MORNING MEAL CAPSULE, DELAYED RELEASE ORAL ONCE A DAY
Status: ACTIVE | COMMUNITY

## 2023-09-22 RX ORDER — AZATHIOPRINE 50 MG/1
TAKE 1 TALET TABLET ORAL DAILY
Qty: 90 | Refills: 0

## 2023-09-22 RX ORDER — GEMFIBROZIL 600 MG/1
TAKE 1 TABLET (600 MG) BY ORAL ROUTE 2 TIMES PER DAY 30 MINUTES BEFORE MORNING AND EVENING MEAL TABLET, FILM COATED ORAL 2
Qty: 0 | Refills: 0 | COMMUNITY
Start: 1900-01-01

## 2023-09-22 RX ORDER — PREDNISONE 5 MG/1
1 TABLET TABLET ORAL ONCE A DAY
Qty: 90 | Refills: 0 | Status: ACTIVE | COMMUNITY

## 2023-09-22 RX ORDER — LEVOTHYROXINE SODIUM 0.1 MG/1
TABLET ORAL
Qty: 0 | Refills: 0 | Status: ACTIVE | COMMUNITY
Start: 1900-01-01

## 2023-09-22 NOTE — HPI-TODAY'S VISIT:
68 y/o Female with autoimmune hepatitis.we d/c prednisone for holyday after last encounter but she also stop Imuran recently becuase she did not call for refill.Denies any symptoms today doing fine.

## 2023-09-24 LAB
ALBUMIN/GLOBULIN RATIO: 1.3
ALBUMIN: 4.3
ALKALINE PHOSPHATASE: 168
ALT (SGPT): 20
AST (SGOT): 29
BILIRUBIN, DIRECT: 0.2
BILIRUBIN, INDIRECT: 0.7
BILIRUBIN, TOTAL: 0.9
GLOBULIN: 3.2
PROTEIN, TOTAL: 7.5

## 2024-03-25 ENCOUNTER — OV EP (OUTPATIENT)
Dept: URBAN - METROPOLITAN AREA CLINIC 82 | Facility: CLINIC | Age: 69
End: 2024-03-25
Payer: MEDICARE

## 2024-03-25 VITALS
TEMPERATURE: 97.9 F | BODY MASS INDEX: 26.99 KG/M2 | HEART RATE: 71 BPM | HEIGHT: 58 IN | WEIGHT: 128.6 LBS | DIASTOLIC BLOOD PRESSURE: 81 MMHG | SYSTOLIC BLOOD PRESSURE: 126 MMHG

## 2024-03-25 DIAGNOSIS — Z86.19 HISTORY OF HELICOBACTER PYLORI INFECTION: ICD-10-CM

## 2024-03-25 DIAGNOSIS — K75.4 AUTOIMMUNE HEPATITIS: ICD-10-CM

## 2024-03-25 PROCEDURE — 99214 OFFICE O/P EST MOD 30 MIN: CPT | Performed by: INTERNAL MEDICINE

## 2024-03-25 RX ORDER — OMEPRAZOLE 20 MG/1
1 CAPSULE 30 MINUTES BEFORE MORNING MEAL CAPSULE, DELAYED RELEASE ORAL ONCE A DAY
Status: ACTIVE | COMMUNITY

## 2024-03-25 RX ORDER — ONDANSETRON 4 MG/1
1 TABLET ON THE TONGUE AND ALLOW TO DISSOLVE TABLET, ORALLY DISINTEGRATING ORAL ONCE A DAY
COMMUNITY

## 2024-03-25 RX ORDER — ATORVASTATIN CALCIUM 20 MG/1
1 TABLET TABLET, FILM COATED ORAL ONCE A DAY
Status: ON HOLD | COMMUNITY

## 2024-03-25 RX ORDER — TRAZODONE HYDROCHLORIDE 50 MG/1
TABLET ORAL
Qty: 0 | Refills: 0 | Status: ACTIVE | COMMUNITY
Start: 1900-01-01

## 2024-03-25 RX ORDER — GEMFIBROZIL 600 MG/1
TAKE 1 TABLET (600 MG) BY ORAL ROUTE 2 TIMES PER DAY 30 MINUTES BEFORE MORNING AND EVENING MEAL TABLET, FILM COATED ORAL 2
Qty: 0 | Refills: 0 | COMMUNITY
Start: 1900-01-01

## 2024-03-25 RX ORDER — AZATHIOPRINE 50 MG/1
1 TABLET TABLET ORAL DAILY
Qty: 90 TABLET | Refills: 2

## 2024-03-25 RX ORDER — PREDNISONE 5 MG/1
1 TABLET TABLET ORAL ONCE A DAY
Qty: 90 | Refills: 0 | Status: ACTIVE | COMMUNITY

## 2024-03-25 RX ORDER — AZATHIOPRINE 75 MG/1
AS DIRECTED TABLET ORAL DAILY
Qty: 30 | Refills: 3 | Status: ON HOLD | COMMUNITY

## 2024-03-25 RX ORDER — LEVOTHYROXINE SODIUM 0.1 MG/1
TABLET ORAL
Qty: 0 | Refills: 0 | Status: ACTIVE | COMMUNITY
Start: 1900-01-01

## 2024-03-25 RX ORDER — AZATHIOPRINE 50 MG/1
TAKE 1 TALET TABLET ORAL DAILY
Qty: 90 | Refills: 0 | Status: ACTIVE | COMMUNITY
End: 2024-05-06

## 2024-03-25 NOTE — HPI-TODAY'S VISIT:
She is doing fine taking her Imuran 50mg monotherapy for AIH. No recent health issues.will do labs today

## 2024-03-26 LAB
ALBUMIN/GLOBULIN RATIO: 1.5
ALBUMIN: 4.4
ALKALINE PHOSPHATASE: 118
ALT (SGPT): 12
AST (SGOT): 21
BILIRUBIN, DIRECT: 0.1
BILIRUBIN, INDIRECT: 0.4
BILIRUBIN, TOTAL: 0.5
GLOBULIN: 3
PROTEIN, TOTAL: 7.4

## 2024-11-25 ENCOUNTER — DASHBOARD ENCOUNTERS (OUTPATIENT)
Age: 69
End: 2024-11-25

## 2024-11-25 ENCOUNTER — OFFICE VISIT (OUTPATIENT)
Dept: URBAN - METROPOLITAN AREA CLINIC 82 | Facility: CLINIC | Age: 69
End: 2024-11-25
Payer: MEDICARE

## 2024-11-25 VITALS
HEART RATE: 75 BPM | HEIGHT: 58 IN | BODY MASS INDEX: 26.07 KG/M2 | DIASTOLIC BLOOD PRESSURE: 76 MMHG | WEIGHT: 124.2 LBS | SYSTOLIC BLOOD PRESSURE: 164 MMHG

## 2024-11-25 DIAGNOSIS — Z51.81 MEDICATION MONITORING ENCOUNTER: ICD-10-CM

## 2024-11-25 DIAGNOSIS — K75.4 AUTOIMMUNE HEPATITIS: ICD-10-CM

## 2024-11-25 PROBLEM — 305058001: Status: ACTIVE | Noted: 2024-11-25

## 2024-11-25 PROCEDURE — 99214 OFFICE O/P EST MOD 30 MIN: CPT | Performed by: INTERNAL MEDICINE

## 2024-11-25 RX ORDER — AZATHIOPRINE 75 MG/1
AS DIRECTED TABLET ORAL DAILY
Qty: 30 | Refills: 3 | Status: ON HOLD | COMMUNITY

## 2024-11-25 RX ORDER — ATORVASTATIN CALCIUM 20 MG/1
1 TABLET TABLET, FILM COATED ORAL ONCE A DAY
Status: ON HOLD | COMMUNITY

## 2024-11-25 RX ORDER — ONDANSETRON 4 MG/1
1 TABLET ON THE TONGUE AND ALLOW TO DISSOLVE TABLET, ORALLY DISINTEGRATING ORAL ONCE A DAY
COMMUNITY

## 2024-11-25 RX ORDER — PREDNISONE 5 MG/1
1 TABLET TABLET ORAL ONCE A DAY
Qty: 90 | Refills: 0 | Status: ON HOLD | COMMUNITY

## 2024-11-25 RX ORDER — OMEPRAZOLE 20 MG/1
1 CAPSULE 30 MINUTES BEFORE MORNING MEAL CAPSULE, DELAYED RELEASE ORAL ONCE A DAY
Status: ACTIVE | COMMUNITY

## 2024-11-25 RX ORDER — GEMFIBROZIL 600 MG/1
TAKE 1 TABLET (600 MG) BY ORAL ROUTE 2 TIMES PER DAY 30 MINUTES BEFORE MORNING AND EVENING MEAL TABLET, FILM COATED ORAL 2
Qty: 0 | Refills: 0 | COMMUNITY
Start: 1900-01-01

## 2024-11-25 RX ORDER — AZATHIOPRINE 50 MG/1
1 TABLET TABLET ORAL DAILY
Qty: 90 | Refills: 2

## 2024-11-25 RX ORDER — TRAZODONE HYDROCHLORIDE 50 MG/1
TABLET ORAL
Qty: 0 | Refills: 0 | Status: ACTIVE | COMMUNITY
Start: 1900-01-01

## 2024-11-25 RX ORDER — LEVOTHYROXINE SODIUM 0.1 MG/1
TABLET ORAL
Qty: 0 | Refills: 0 | Status: ACTIVE | COMMUNITY
Start: 1900-01-01

## 2024-11-25 RX ORDER — AZATHIOPRINE 50 MG/1
1 TABLET TABLET ORAL DAILY
Qty: 90 TABLET | Refills: 2 | Status: ACTIVE | COMMUNITY

## 2024-11-25 NOTE — HPI-TODAY'S VISIT:
No Changes since last office visit.Continue monotherapy with azathioprine 50m Colonoscopy 2018 with no polyps will recommend repeat in 2026 Skin eval with dermatology recommended

## 2025-07-21 ENCOUNTER — OFFICE VISIT (OUTPATIENT)
Dept: URBAN - METROPOLITAN AREA CLINIC 82 | Facility: CLINIC | Age: 70
End: 2025-07-21
Payer: MEDICARE

## 2025-07-21 DIAGNOSIS — Z51.81 MEDICATION MONITORING ENCOUNTER: ICD-10-CM

## 2025-07-21 DIAGNOSIS — D84.821 IMMUNODEFICIENCY DUE TO DRUGS: ICD-10-CM

## 2025-07-21 DIAGNOSIS — K75.4 AUTOIMMUNE HEPATITIS: ICD-10-CM

## 2025-07-21 PROCEDURE — 99214 OFFICE O/P EST MOD 30 MIN: CPT | Performed by: INTERNAL MEDICINE

## 2025-07-21 RX ORDER — AZATHIOPRINE 75 MG/1
AS DIRECTED TABLET ORAL DAILY
Qty: 30 | Refills: 3 | Status: ON HOLD | COMMUNITY

## 2025-07-21 RX ORDER — OMEPRAZOLE 20 MG/1
1 CAPSULE 30 MINUTES BEFORE MORNING MEAL CAPSULE, DELAYED RELEASE ORAL ONCE A DAY
Status: ACTIVE | COMMUNITY

## 2025-07-21 RX ORDER — LEVOTHYROXINE SODIUM 0.1 MG/1
TABLET ORAL
Qty: 0 | Refills: 0 | Status: ACTIVE | COMMUNITY
Start: 1900-01-01

## 2025-07-21 RX ORDER — ONDANSETRON 4 MG/1
1 TABLET ON THE TONGUE AND ALLOW TO DISSOLVE TABLET, ORALLY DISINTEGRATING ORAL ONCE A DAY
COMMUNITY

## 2025-07-21 RX ORDER — GEMFIBROZIL 600 MG/1
TAKE 1 TABLET (600 MG) BY ORAL ROUTE 2 TIMES PER DAY 30 MINUTES BEFORE MORNING AND EVENING MEAL TABLET, FILM COATED ORAL 2
Qty: 0 | Refills: 0 | COMMUNITY
Start: 1900-01-01

## 2025-07-21 RX ORDER — ATORVASTATIN CALCIUM 20 MG/1
1 TABLET TABLET, FILM COATED ORAL ONCE A DAY
Status: ON HOLD | COMMUNITY

## 2025-07-21 RX ORDER — PREDNISONE 5 MG/1
1 TABLET TABLET ORAL ONCE A DAY
Qty: 90 | Refills: 0 | Status: ON HOLD | COMMUNITY

## 2025-07-21 RX ORDER — TRAZODONE HYDROCHLORIDE 50 MG/1
TABLET ORAL
Qty: 0 | Refills: 0 | Status: ACTIVE | COMMUNITY
Start: 1900-01-01

## 2025-07-21 RX ORDER — AZATHIOPRINE 50 MG/1
1 TABLET TABLET ORAL DAILY
Qty: 90 | Refills: 2 | Status: ACTIVE | COMMUNITY

## 2025-07-21 NOTE — HPI-TODAY'S VISIT:
70 y/o  female with AIH on monotherapy with Imuran came for routine f/up/Liver enzymes are astable on 2024 and she refer labs with PCP 02/2025 and they were normal as well. Given mroe than 2 years of biochemical remision will provide medication holyday and refer for dermatology for skin screening for cancer.Will repeat labs in 2 weeks after imuran discontinuation.[place] is due on 2026

## 2025-07-22 LAB
ALBUMIN/GLOBULIN RATIO: 1.6
ALBUMIN: 4.3
ALKALINE PHOSPHATASE: 109
ALT (SGPT): 11
AST (SGOT): 20
BILIRUBIN, DIRECT: 0.1
BILIRUBIN, INDIRECT: 0.4
BILIRUBIN, TOTAL: 0.5
GLOBULIN: 2.7
IMMUNOGLOBULIN G, QN, SERUM: 1365
PROTEIN, TOTAL: 7

## 2025-07-31 ENCOUNTER — LAB OUTSIDE AN ENCOUNTER (OUTPATIENT)
Dept: URBAN - METROPOLITAN AREA CLINIC 82 | Facility: CLINIC | Age: 70
End: 2025-07-31

## 2025-07-31 ENCOUNTER — TELEPHONE ENCOUNTER (OUTPATIENT)
Dept: URBAN - METROPOLITAN AREA CLINIC 82 | Facility: CLINIC | Age: 70
End: 2025-07-31

## 2025-08-18 ENCOUNTER — TELEPHONE ENCOUNTER (OUTPATIENT)
Dept: URBAN - METROPOLITAN AREA CLINIC 6 | Facility: CLINIC | Age: 70
End: 2025-08-18